# Patient Record
(demographics unavailable — no encounter records)

---

## 2019-05-24 RX ORDER — NORETHINDRONE 0.35 MG/1
TABLET ORAL
Qty: 28 TABLET | Refills: 3 | OUTPATIENT
Start: 2019-05-24

## 2019-05-24 NOTE — TELEPHONE ENCOUNTER
Medication refused due to failing protocol.    Requested Prescriptions   Pending Prescriptions Disp Refills    PETE 0.35 mg tablet [Pharmacy Med Name: PETE 0.35 MG TABLET] 28 tablet 3     Sig: TAKE 1 TABLET BY MOUTH EVERY DAY       Oral Contraceptives Protocol Failed - 5/24/2019  1:49 AM        Failed - Recent or future visit with authorizing provider        Failed - Active on medication list        Failed - Not a current smoker over age 35        Failed - Up to date with pap smear Health Maintenance        Passed - No active pregnancy on record        Passed - No positive pregnancy test in the past 12 months        Passed - Patient does not have contraindications diagnosis